# Patient Record
Sex: FEMALE | Race: WHITE | NOT HISPANIC OR LATINO | Employment: OTHER | URBAN - METROPOLITAN AREA
[De-identification: names, ages, dates, MRNs, and addresses within clinical notes are randomized per-mention and may not be internally consistent; named-entity substitution may affect disease eponyms.]

---

## 2019-10-28 ENCOUNTER — OFFICE VISIT CONVERTED (OUTPATIENT)
Dept: GASTROENTEROLOGY | Facility: CLINIC | Age: 79
End: 2019-10-28
Attending: PHYSICIAN ASSISTANT

## 2019-10-28 ENCOUNTER — CONVERSION ENCOUNTER (OUTPATIENT)
Dept: GASTROENTEROLOGY | Facility: CLINIC | Age: 79
End: 2019-10-28

## 2019-11-13 ENCOUNTER — HOSPITAL ENCOUNTER (OUTPATIENT)
Dept: GASTROENTEROLOGY | Facility: HOSPITAL | Age: 79
Setting detail: HOSPITAL OUTPATIENT SURGERY
Discharge: HOME | End: 2019-11-13
Attending: INTERNAL MEDICINE

## 2020-02-11 ENCOUNTER — OFFICE VISIT CONVERTED (OUTPATIENT)
Dept: GASTROENTEROLOGY | Facility: CLINIC | Age: 80
End: 2020-02-11
Attending: PHYSICIAN ASSISTANT

## 2020-05-11 ENCOUNTER — TELEPHONE CONVERTED (OUTPATIENT)
Dept: GASTROENTEROLOGY | Facility: CLINIC | Age: 80
End: 2020-05-11
Attending: PHYSICIAN ASSISTANT

## 2021-02-22 ENCOUNTER — HOSPITAL ENCOUNTER (OUTPATIENT)
Dept: VACCINE CLINIC | Facility: HOSPITAL | Age: 81
Discharge: HOME | End: 2021-02-22
Attending: INTERNAL MEDICINE

## 2021-03-25 ENCOUNTER — HOSPITAL ENCOUNTER (OUTPATIENT)
Dept: VACCINE CLINIC | Facility: HOSPITAL | Age: 81
Discharge: HOME | End: 2021-03-25
Attending: INTERNAL MEDICINE

## 2021-05-13 NOTE — PROGRESS NOTES
Quick Note      Patient Name: Ibis Tillman   Patient ID: [id number]   Sex: Female   YOB: 1940    Primary Care Provider: Ailyn Morrison PA-C    Visit Date: May 11, 2020    Provider: Brice Starr PA-C   Location: [facility]   Location Address: [address] [place] [address]  [id number]   Location Phone: [phone]          History Of Present Illness  TELEHEALTH TELEPHONE VISIT  Chief Complaint: Medication follow up   Ibis Tillman is a 79 year old female who is presenting for evaluation via telehealth telephone visit. Verbal consent obtained before beginning visit.   Provider spent 11 minutes with the patient during telehealth visit.   The following staff were present during this visit: Dimas Aguero MA   Past Medical History/Overview of Patient Symptoms     79-year-old female with history of hypertension, hyperlipidemia, and hysterectomy follows for medication.  Review of the colonoscopy by Dr. Dunlap 11/13/2019 showed internal hemorrhoids, diverticulosis sigmoid colon, and negative random biopsies.  She is on half a scoop of cholestyramine powder once daily which fluctuates her bowel movements from 6 times daily to once every 6 days.  She was given a trial of bentyl 10mg and she takes Metamucil which have resolved her symptoms.           Assessment  · Altered bowel habits     787.99/R19.4      Plan  · Orders  o Physician Telephone Evaluation, 11-20 minutes (82131) - 787.99/R19.4 - 05/11/2020  · Medications  o Medications have been Reconciled  o Transition of Care or Provider Policy  · Instructions  o Plan Of Care: Overall, the patient is doing well. Her symptoms have improved with benyl 10mg and Metamucil. She will call for worsening symptoms and follow up as needed.  o Chronic conditions reviewed and taken into consideration for today's treatment plan.  o Patient instructed to seek medical attention urgently for new or worsening symptoms.  o Patient was educated/instructed  on their diagnosis, treatment and medications prior to discharge from the clinic today.            Electronically Signed by: Brice Starr PA-C -Author on May 11, 2020 08:37:27 AM  Electronically Co-signed by: Francisco Dunlap MD -Reviewer on May 12, 2020 03:44:35 PM

## 2021-05-15 VITALS
DIASTOLIC BLOOD PRESSURE: 86 MMHG | OXYGEN SATURATION: 98 % | WEIGHT: 131.25 LBS | HEART RATE: 106 BPM | SYSTOLIC BLOOD PRESSURE: 210 MMHG | HEIGHT: 61 IN | BODY MASS INDEX: 24.78 KG/M2 | RESPIRATION RATE: 12 BRPM

## 2021-05-15 VITALS
HEART RATE: 88 BPM | HEIGHT: 61 IN | BODY MASS INDEX: 24.73 KG/M2 | RESPIRATION RATE: 16 BRPM | WEIGHT: 131 LBS | DIASTOLIC BLOOD PRESSURE: 80 MMHG | OXYGEN SATURATION: 98 % | SYSTOLIC BLOOD PRESSURE: 142 MMHG

## 2021-05-19 ENCOUNTER — HOSPITAL ENCOUNTER (OUTPATIENT)
Dept: LAB | Facility: HOSPITAL | Age: 81
Discharge: HOME | End: 2021-05-19
Attending: PHYSICIAN ASSISTANT

## 2021-05-19 LAB
ALBUMIN SERPL-MCNC: 4.3 G/DL (ref 3.5–5)
ALBUMIN/GLOB SERPL: 1.3 {RATIO} (ref 1.4–2.6)
ALP SERPL-CCNC: 71 U/L (ref 43–160)
ALT SERPL-CCNC: 11 U/L (ref 10–40)
ANION GAP SERPL CALC-SCNC: 16 MMOL/L (ref 8–19)
AST SERPL-CCNC: 19 U/L (ref 15–50)
BASOPHILS # BLD AUTO: 0.03 10*3/UL (ref 0–0.2)
BASOPHILS NFR BLD AUTO: 0.3 % (ref 0–3)
BILIRUB SERPL-MCNC: 0.4 MG/DL (ref 0.2–1.3)
BUN SERPL-MCNC: 10 MG/DL (ref 5–25)
BUN/CREAT SERPL: 13 {RATIO} (ref 6–20)
C DIFF TOX B STL QL CT TISS CULT: NEGATIVE
CALCIUM SERPL-MCNC: 9.2 MG/DL (ref 8.7–10.4)
CHLORIDE SERPL-SCNC: 95 MMOL/L (ref 99–111)
CONV 027 TOXIN: NEGATIVE
CONV ABS IMM GRAN: 0.01 10*3/UL (ref 0–0.2)
CONV CO2: 26 MMOL/L (ref 22–32)
CONV IMMATURE GRAN: 0.1 % (ref 0–1.8)
CONV TOTAL PROTEIN: 7.6 G/DL (ref 6.3–8.2)
CREAT UR-MCNC: 0.79 MG/DL (ref 0.5–0.9)
DEPRECATED RDW RBC AUTO: 41.2 FL (ref 36.4–46.3)
EOSINOPHIL # BLD AUTO: 0.06 10*3/UL (ref 0–0.7)
EOSINOPHIL # BLD AUTO: 0.7 % (ref 0–7)
ERYTHROCYTE [DISTWIDTH] IN BLOOD BY AUTOMATED COUNT: 13.2 % (ref 11.7–14.4)
GFR SERPLBLD BASED ON 1.73 SQ M-ARVRAT: >60 ML/MIN/{1.73_M2}
GLOBULIN UR ELPH-MCNC: 3.3 G/DL (ref 2–3.5)
GLUCOSE SERPL-MCNC: 125 MG/DL (ref 65–99)
HCT VFR BLD AUTO: 40.1 % (ref 37–47)
HGB BLD-MCNC: 13.1 G/DL (ref 12–16)
LYMPHOCYTES # BLD AUTO: 7.12 10*3/UL (ref 1–5)
LYMPHOCYTES NFR BLD AUTO: 81.7 % (ref 20–45)
MCH RBC QN AUTO: 28.1 PG (ref 27–31)
MCHC RBC AUTO-ENTMCNC: 32.7 G/DL (ref 33–37)
MCV RBC AUTO: 86.1 FL (ref 81–99)
MONOCYTES # BLD AUTO: 0.64 10*3/UL (ref 0.2–1.2)
MONOCYTES NFR BLD AUTO: 7.3 % (ref 3–10)
NEUTROPHILS # BLD AUTO: 0.85 10*3/UL (ref 2–8)
NEUTROPHILS NFR BLD AUTO: 9.9 % (ref 30–85)
NRBC CBCN: 0 % (ref 0–0.7)
OSMOLALITY SERPL CALC.SUM OF ELEC: 279 MOSM/KG (ref 273–304)
PLATELET # BLD AUTO: 210 10*3/UL (ref 130–400)
PMV BLD AUTO: 8.5 FL (ref 9.4–12.3)
POTASSIUM SERPL-SCNC: 3.3 MMOL/L (ref 3.5–5.3)
RBC # BLD AUTO: 4.66 10*6/UL (ref 4.2–5.4)
SODIUM SERPL-SCNC: 134 MMOL/L (ref 135–147)
WBC # BLD AUTO: 8.71 10*3/UL (ref 4.8–10.8)

## 2021-05-21 LAB
C COLI+JEJ+UPSA DNA STL QL NAA+NON-PROBE: NEGATIVE
CONV SALMONELLA SPECIES: NEGATIVE
CONV SHIGA TOXIN: NEGATIVE
CONV SHIGELLA SPECIES: NEGATIVE

## 2021-06-01 ENCOUNTER — HOSPITAL ENCOUNTER (OUTPATIENT)
Dept: LAB | Facility: HOSPITAL | Age: 81
Discharge: HOME | End: 2021-06-01
Attending: PHYSICIAN ASSISTANT

## 2021-06-01 LAB
ANION GAP SERPL CALC-SCNC: 18 MMOL/L (ref 8–19)
BUN SERPL-MCNC: 8 MG/DL (ref 5–25)
BUN/CREAT SERPL: 11 {RATIO} (ref 6–20)
CALCIUM SERPL-MCNC: 9 MG/DL (ref 8.7–10.4)
CHLORIDE SERPL-SCNC: 91 MMOL/L (ref 99–111)
CONV CO2: 24 MMOL/L (ref 22–32)
CREAT UR-MCNC: 0.74 MG/DL (ref 0.5–0.9)
GFR SERPLBLD BASED ON 1.73 SQ M-ARVRAT: >60 ML/MIN/{1.73_M2}
GLUCOSE SERPL-MCNC: 128 MG/DL (ref 65–99)
OSMOLALITY SERPL CALC.SUM OF ELEC: 268 MOSM/KG (ref 273–304)
POTASSIUM SERPL-SCNC: 3.7 MMOL/L (ref 3.5–5.3)
SODIUM SERPL-SCNC: 129 MMOL/L (ref 135–147)

## 2021-06-15 ENCOUNTER — TRANSCRIBE ORDERS (OUTPATIENT)
Dept: LAB | Facility: HOSPITAL | Age: 81
End: 2021-06-15

## 2021-06-15 ENCOUNTER — LAB (OUTPATIENT)
Dept: LAB | Facility: HOSPITAL | Age: 81
End: 2021-06-15

## 2021-06-15 DIAGNOSIS — R73.01 IMPAIRED FASTING GLUCOSE: ICD-10-CM

## 2021-06-15 DIAGNOSIS — R73.01 IMPAIRED FASTING GLUCOSE: Primary | ICD-10-CM

## 2021-06-15 LAB
ANION GAP SERPL CALCULATED.3IONS-SCNC: 11.4 MMOL/L (ref 5–15)
BUN SERPL-MCNC: 9 MG/DL (ref 8–23)
BUN/CREAT SERPL: 11.5 (ref 7–25)
CALCIUM SPEC-SCNC: 8.8 MG/DL (ref 8.6–10.5)
CHLORIDE SERPL-SCNC: 92 MMOL/L (ref 98–107)
CO2 SERPL-SCNC: 25.6 MMOL/L (ref 22–29)
CREAT SERPL-MCNC: 0.78 MG/DL (ref 0.57–1)
GFR SERPL CREATININE-BSD FRML MDRD: 71 ML/MIN/1.73
GLUCOSE SERPL-MCNC: 143 MG/DL (ref 65–99)
POTASSIUM SERPL-SCNC: 3.6 MMOL/L (ref 3.5–5.2)
SODIUM SERPL-SCNC: 129 MMOL/L (ref 136–145)

## 2021-06-15 PROCEDURE — 80048 BASIC METABOLIC PNL TOTAL CA: CPT

## 2021-06-15 PROCEDURE — 36415 COLL VENOUS BLD VENIPUNCTURE: CPT

## 2021-09-09 ENCOUNTER — OFFICE VISIT (OUTPATIENT)
Dept: GASTROENTEROLOGY | Facility: CLINIC | Age: 81
End: 2021-09-09

## 2021-09-09 VITALS
BODY MASS INDEX: 25.39 KG/M2 | DIASTOLIC BLOOD PRESSURE: 88 MMHG | HEIGHT: 60 IN | WEIGHT: 129.3 LBS | OXYGEN SATURATION: 99 % | HEART RATE: 101 BPM | SYSTOLIC BLOOD PRESSURE: 174 MMHG

## 2021-09-09 DIAGNOSIS — R19.4 ALTERED BOWEL HABITS: Primary | ICD-10-CM

## 2021-09-09 DIAGNOSIS — K58.0 IRRITABLE BOWEL SYNDROME WITH DIARRHEA: ICD-10-CM

## 2021-09-09 DIAGNOSIS — R19.7 DIARRHEA, UNSPECIFIED TYPE: ICD-10-CM

## 2021-09-09 PROCEDURE — 99213 OFFICE O/P EST LOW 20 MIN: CPT | Performed by: NURSE PRACTITIONER

## 2021-09-09 RX ORDER — DICYCLOMINE HYDROCHLORIDE 10 MG/1
10 CAPSULE ORAL
COMMUNITY
End: 2021-09-09 | Stop reason: SDUPTHER

## 2021-09-09 RX ORDER — LISINOPRIL AND HYDROCHLOROTHIAZIDE 25; 20 MG/1; MG/1
1 TABLET ORAL DAILY
COMMUNITY
Start: 2021-06-26

## 2021-09-09 RX ORDER — DICYCLOMINE HYDROCHLORIDE 10 MG/1
10 CAPSULE ORAL
Qty: 120 CAPSULE | Refills: 2 | Status: SHIPPED | OUTPATIENT
Start: 2021-09-09

## 2021-09-09 RX ORDER — CHOLESTYRAMINE 4 G/9G
1 POWDER, FOR SUSPENSION ORAL 3 TIMES DAILY PRN
COMMUNITY
Start: 2021-05-20 | End: 2021-09-09

## 2021-09-09 RX ORDER — PRAVASTATIN SODIUM 40 MG
1 TABLET ORAL DAILY
COMMUNITY

## 2021-09-09 RX ORDER — MONTELUKAST SODIUM 4 MG/1
2 TABLET, CHEWABLE ORAL 2 TIMES DAILY
Qty: 120 TABLET | Refills: 3 | Status: SHIPPED | OUTPATIENT
Start: 2021-09-09 | End: 2022-02-01

## 2021-09-09 RX ORDER — AMLODIPINE BESYLATE 5 MG/1
5 TABLET ORAL DAILY
COMMUNITY
Start: 2021-08-08

## 2021-09-09 NOTE — PATIENT INSTRUCTIONS
"Diet for Irritable Bowel Syndrome  When you have irritable bowel syndrome (IBS), it is very important to eat the foods and follow the eating habits that are best for your condition. IBS may cause various symptoms such as pain in the abdomen, constipation, or diarrhea. Choosing the right foods can help to ease the discomfort from these symptoms. Work with your health care provider and diet and nutrition specialist (dietitian) to find the eating plan that will help to control your symptoms.  What are tips for following this plan?         · Keep a food diary. This will help you identify foods that cause symptoms. Write down:  ? What you eat and when you eat it.  ? What symptoms you have.  ? When symptoms occur in relation to your meals, such as \"pain in abdomen 2 hours after dinner.\"  · Eat your meals slowly and in a relaxed setting.  · Aim to eat 5-6 small meals per day. Do not skip meals.  · Drink enough fluid to keep your urine pale yellow.  · Ask your health care provider if you should take an over-the-counter probiotic to help restore healthy bacteria in your gut (digestive tract).  ? Probiotics are foods that contain good bacteria and yeasts.  · Your dietitian may have specific dietary recommendations for you based on your symptoms. He or she may recommend that you:  ? Avoid foods that cause symptoms. Talk with your dietitian about other ways to get the same nutrients that are in those problem foods.  ? Avoid foods with gluten. Gluten is a protein that is found in rye, wheat, and barley.  ? Eat more foods that contain soluble fiber. Examples of foods with high soluble fiber include oats, seeds, and certain fruits and vegetables. Take a fiber supplement if directed by your dietitian.  ? Reduce or avoid certain foods called FODMAPs. These are foods that contain carbohydrates that are hard to digest. Ask your doctor which foods contain these carbohydrates.  What foods are not recommended?  The following are some " foods and drinks that may make your symptoms worse:  · Fatty foods, such as french fries.  · Foods that contain gluten, such as pasta and cereal.  · Dairy products, such as milk, cheese, and ice cream.  · Chocolate.  · Alcohol.  · Products with caffeine, such as coffee.  · Carbonated drinks, such as soda.  · Foods that are high in FODMAPs. These include certain fruits and vegetables.  · Products with sweeteners such as honey, high fructose corn syrup, sorbitol, and mannitol.  The items listed above may not be a complete list of foods and beverages you should avoid. Contact a dietitian for more information.  What foods are good sources of fiber?  Your health care provider or dietitian may recommend that you eat more foods that contain fiber. Fiber can help to reduce constipation and other IBS symptoms. Add foods with fiber to your diet a little at a time so your body can get used to them. Too much fiber at one time might cause gas and swelling of your abdomen. The following are some foods that are good sources of fiber:  · Berries, such as raspberries, strawberries, and blueberries.  · Tomatoes.  · Carrots.  · Brown rice.  · Oats.  · Seeds, such as heaven and pumpkin seeds.  The items listed above may not be a complete list of recommended sources of fiber. Contact your dietitian for more options.  Where to find more information  · International Foundation for Functional Gastrointestinal Disorders: www.iffgd.org  · National [place] of Diabetes and Digestive and Kidney Diseases: www.niddk.nih.gov  Summary  · When you have irritable bowel syndrome (IBS), it is very important to eat the foods and follow the eating habits that are best for your condition.  · IBS may cause various symptoms such as pain in the abdomen, constipation, or diarrhea.  · Choosing the right foods can help to ease the discomfort that comes from symptoms.  · Keep a food diary. This will help you identify foods that cause symptoms.  · Your health  care provider or diet and nutrition specialist (dietitian) may recommend that you eat more foods that contain fiber.  This information is not intended to replace advice given to you by your health care provider. Make sure you discuss any questions you have with your health care provider.  Document Revised: 04/08/2020 Document Reviewed: 08/21/2018  [employer] Patient Education © 2021 [employer]

## 2021-09-09 NOTE — PROGRESS NOTES
Chief Complaint  Hayley Tillman is a 81 y.o. female who presents to [facility]     HPI  81-year-old female presented office today for follow-up with a history of diarrhea.  Patient is currently taking Questran 4 g packet 2 times daily, Bentyl 10 mg as needed and Metamucil once daily.  Patient reports that a couple of months ago she did have loose stools and was not on the colestipol.  She started back on the colestipol and had resolution of her symptoms.  Patient wishes to switch to tablets rather than packets.  She is taking the Bentyl twice daily usually.  She reports a firm stool daily.  Patient denies fever, nausea, vomiting, weight loss, night sweats, melena, hematochezia, hematemesis.    Colonoscopy: Review of the patient's most recent colonoscopy performed by Dr. Dunlap on 11/13/2019 revealed a few nonbleeding diverticula, grade 1 hemorrhoids and mild diverticulosis otherwise normal.      Result Review :   The following data was reviewed by: Adelina Cruz NP on 09/09/2021 for             Past Medical History:   Diagnosis Date   • Essential hypertension    • High cholesterol        Past Surgical History:   Procedure Laterality Date   • COLONOSCOPY  2019   • HYSTERECTOMY           Current Outpatient Medications:   •  amLODIPine (NORVASC) 5 MG tablet, Take 5 mg by mouth Daily. for blood pressure., Disp: , Rfl:   •  dicyclomine (BENTYL) 10 MG capsule, Take 1 capsule by mouth 4 (Four) Times a Day Before Meals & at Bedtime., Disp: 120 capsule, Rfl: 2  •  lisinopril-hydrochlorothiazide (PRINZIDE,ZESTORETIC) 20-25 MG per tablet, Take 1 tablet by mouth Daily. for blood pressure, Disp: , Rfl:   •  pravastatin (PRAVACHOL) 40 MG tablet, Take 1 tablet by mouth Daily., Disp: , Rfl:   •  psyllium (METAMUCIL) 58.6 % packet, Take 1 packet by mouth Daily., Disp: , Rfl:   •  colestipol (Colestid) 1 g tablet, Take 2 tablets by mouth 2 (Two) Times a Day., Disp: 120 tablet, Rfl: 3  "    Allergies   Allergen Reactions   • Penicillins Other (See Comments)     Causes sores on body   • Sulfa Antibiotics Swelling       History reviewed. No pertinent family history.     Social History     Social History Narrative   • Not on file       Objective     Vital Signs:   /88 (BP Location: Right arm, Patient Position: Sitting, Cuff Size: Adult)   Pulse 101   Ht 152.4 cm (60\")   Wt 58.7 kg (129 lb 4.8 oz)   SpO2 99%   BMI 25.25 kg/m²     Body mass index is 25.25 kg/m².    Physical Exam  Constitutional:       General: She is not in acute distress.     Appearance: Normal appearance.   HENT:      Head: Normocephalic.   Eyes:      Conjunctiva/sclera: Conjunctivae normal.      Pupils: Pupils are equal, round, and reactive to light.      Visual Fields: Right eye visual fields normal and left eye visual fields normal.   Neck:      Trachea: Trachea normal.   Cardiovascular:      Rate and Rhythm: Normal rate and regular rhythm.      Heart sounds: Normal heart sounds.   Pulmonary:      Effort: Pulmonary effort is normal.      Breath sounds: Normal breath sounds and air entry.      Comments: Inspection of chest: normal appearance  Abdominal:      General: Abdomen is flat. Bowel sounds are normal.      Palpations: Abdomen is soft. There is no mass.      Tenderness: There is no guarding.   Musculoskeletal:      Right lower leg: No edema.      Left lower leg: No edema.   Skin:     Findings: No lesion.      Comments: Turgor is normal   Neurological:      Mental Status: She is alert and oriented to person, place, and time.   Psychiatric:         Mood and Affect: Mood and affect normal.                 Assessment and Plan    Diagnoses and all orders for this visit:    1. Altered bowel habits (Primary)    2. Diarrhea, unspecified type    3. Irritable bowel syndrome with diarrhea    Other orders  -     colestipol (Colestid) 1 g tablet; Take 2 tablets by mouth 2 (Two) Times a Day.  Dispense: 120 tablet; Refill: 3  -    "  dicyclomine (BENTYL) 10 MG capsule; Take 1 capsule by mouth 4 (Four) Times a Day Before Meals & at Bedtime.  Dispense: 120 capsule; Refill: 2    81-year-old female presented office today for follow-up with a history of diarrhea.  Patient has good control of symptoms with colestipol, Bentyl and Metamucil.  I have changed her Questran to colestipol 2 g twice daily.  Patient will continue Bentyl as needed and Metamucil.  We will follow up in the office in 6 months.  Patient is agreeable to this plan and will call with any questions or concerns.          Follow Up   Return in about 6 months (around 3/9/2022).  Patient was given instructions and counseling regarding her condition or for health maintenance advice. Please see specific information pulled into the AVS if appropriate.

## 2021-09-13 ENCOUNTER — TELEPHONE (OUTPATIENT)
Dept: GASTROENTEROLOGY | Facility: CLINIC | Age: 81
End: 2021-09-13

## 2021-09-13 NOTE — TELEPHONE ENCOUNTER
"Patient called the office stating she cannot take the colestipol pills. They're hard to swallow and \"get stuck in her throat.\" Patient advised to use the powder in the mean time and I will talk with Adelina to see if there is anything we can switch to. Patient aware Adelina is out of office. Patient states her bowel movements are the same, and no diarrhea. Please advise.   "

## 2021-09-20 NOTE — TELEPHONE ENCOUNTER
If the patient is unable to swallow the pills the powder would be the only other option for this medication class. Being that it is working I would remain on the medication.

## 2021-12-09 ENCOUNTER — TELEPHONE (OUTPATIENT)
Dept: GASTROENTEROLOGY | Facility: CLINIC | Age: 81
End: 2021-12-09

## 2021-12-09 NOTE — TELEPHONE ENCOUNTER
"Patient called the office stating her diarrhea has worsened. Patient states \"the colestipol packets aren't working.\" I asked patient how often she is taking the packets. She states she is only using a half a packet per day. I advised patient to increase to 1 packet BID. Patient agreed to try this, and is aware to contact the office if the diarrhea worsens. Patient denies any abdominal pain or fever. Patient states she has had stool studies in the past with other providers, and the results were negative. I also rescheduled patient's appointment to come into the office sooner. Patient is aware of new appt. Patient to call if she needs anything.   "

## 2022-02-01 ENCOUNTER — OFFICE VISIT (OUTPATIENT)
Dept: GASTROENTEROLOGY | Facility: CLINIC | Age: 82
End: 2022-02-01

## 2022-02-01 VITALS
DIASTOLIC BLOOD PRESSURE: 84 MMHG | OXYGEN SATURATION: 100 % | HEART RATE: 95 BPM | BODY MASS INDEX: 25.29 KG/M2 | HEIGHT: 60 IN | WEIGHT: 128.8 LBS | SYSTOLIC BLOOD PRESSURE: 156 MMHG

## 2022-02-01 DIAGNOSIS — K58.0 IRRITABLE BOWEL SYNDROME WITH DIARRHEA: Primary | ICD-10-CM

## 2022-02-01 DIAGNOSIS — R14.0 ABDOMINAL BLOATING: ICD-10-CM

## 2022-02-01 PROCEDURE — 99213 OFFICE O/P EST LOW 20 MIN: CPT | Performed by: NURSE PRACTITIONER

## 2022-02-01 RX ORDER — CHOLESTYRAMINE 4 G/9G
2 POWDER, FOR SUSPENSION ORAL DAILY
COMMUNITY
Start: 2021-12-02 | End: 2022-02-28

## 2022-02-01 RX ORDER — SIMETHICONE 80 MG
80 TABLET,CHEWABLE ORAL EVERY 6 HOURS PRN
COMMUNITY

## 2022-02-28 RX ORDER — CHOLESTYRAMINE 4 G/9G
POWDER, FOR SUSPENSION ORAL
Qty: 270 PACKET | Refills: 1 | Status: SHIPPED | OUTPATIENT
Start: 2022-02-28

## 2022-11-01 ENCOUNTER — OFFICE VISIT (OUTPATIENT)
Dept: GASTROENTEROLOGY | Facility: CLINIC | Age: 82
End: 2022-11-01

## 2022-11-01 VITALS
SYSTOLIC BLOOD PRESSURE: 132 MMHG | DIASTOLIC BLOOD PRESSURE: 62 MMHG | WEIGHT: 129.1 LBS | OXYGEN SATURATION: 100 % | HEIGHT: 60 IN | BODY MASS INDEX: 25.35 KG/M2 | HEART RATE: 97 BPM

## 2022-11-01 DIAGNOSIS — K58.0 IRRITABLE BOWEL SYNDROME WITH DIARRHEA: Primary | ICD-10-CM

## 2022-11-01 DIAGNOSIS — R15.9 INCONTINENCE OF FECES, UNSPECIFIED FECAL INCONTINENCE TYPE: ICD-10-CM

## 2022-11-01 PROCEDURE — 99213 OFFICE O/P EST LOW 20 MIN: CPT

## 2022-11-01 RX ORDER — CHOLESTYRAMINE 4 G/5.5G
POWDER, FOR SUSPENSION ORAL
COMMUNITY
Start: 2022-10-12

## 2023-04-29 ENCOUNTER — HOSPITAL ENCOUNTER (EMERGENCY)
Facility: HOSPITAL | Age: 83
Discharge: HOME | End: 2023-04-29
Attending: EMERGENCY MEDICINE
Payer: MEDICARE

## 2023-04-29 ENCOUNTER — APPOINTMENT (OUTPATIENT)
Dept: GENERAL RADIOLOGY | Facility: HOSPITAL | Age: 83
End: 2023-04-29
Payer: MEDICARE

## 2023-04-29 VITALS
HEART RATE: 93 BPM | HEIGHT: 60 IN | SYSTOLIC BLOOD PRESSURE: 138 MMHG | TEMPERATURE: 98.1 F | RESPIRATION RATE: 17 BRPM | BODY MASS INDEX: 24.54 KG/M2 | DIASTOLIC BLOOD PRESSURE: 77 MMHG | WEIGHT: 125 LBS | OXYGEN SATURATION: 99 %

## 2023-04-29 DIAGNOSIS — U07.1 COVID-19: Primary | ICD-10-CM

## 2023-04-29 LAB
ALBUMIN SERPL-MCNC: 4.6 G/DL (ref 3.5–5.2)
ALBUMIN/GLOB SERPL: 1.5 G/DL
ALP SERPL-CCNC: 80 U/L (ref 39–117)
ALT SERPL W P-5'-P-CCNC: 9 U/L (ref 1–33)
ANION GAP SERPL CALCULATED.3IONS-SCNC: 14.8 MMOL/L (ref 5–15)
AST SERPL-CCNC: 18 U/L (ref 1–32)
BASOPHILS # BLD AUTO: 0.01 10*3/MM3 (ref 0–0.2)
BASOPHILS NFR BLD AUTO: 0.2 % (ref 0–1.5)
BILIRUB SERPL-MCNC: 0.4 MG/DL (ref 0–1.2)
BUN SERPL-MCNC: 11 MG/DL (ref 8–23)
BUN/CREAT SERPL: 14.9 (ref 7–25)
CALCIUM SPEC-SCNC: 9.6 MG/DL (ref 8.6–10.5)
CHLORIDE SERPL-SCNC: 93 MMOL/L (ref 98–107)
CLUMPED PLATELETS: PRESENT
CO2 SERPL-SCNC: 27.2 MMOL/L (ref 22–29)
CREAT SERPL-MCNC: 0.74 MG/DL (ref 0.57–1)
DEPRECATED RDW RBC AUTO: 41.3 FL (ref 37–54)
EGFRCR SERPLBLD CKD-EPI 2021: 80.9 ML/MIN/1.73
EOSINOPHIL # BLD AUTO: 0.05 10*3/MM3 (ref 0–0.4)
EOSINOPHIL NFR BLD AUTO: 0.9 % (ref 0.3–6.2)
ERYTHROCYTE [DISTWIDTH] IN BLOOD BY AUTOMATED COUNT: 13.2 % (ref 12.3–15.4)
GLOBULIN UR ELPH-MCNC: 3.1 GM/DL
GLUCOSE SERPL-MCNC: 126 MG/DL (ref 65–99)
HCT VFR BLD AUTO: 38 % (ref 34–46.6)
HGB BLD-MCNC: 13.2 G/DL (ref 12–15.9)
HOLD SPECIMEN: NORMAL
HOLD SPECIMEN: NORMAL
IMM GRANULOCYTES # BLD AUTO: 0.01 10*3/MM3 (ref 0–0.05)
IMM GRANULOCYTES NFR BLD AUTO: 0.2 % (ref 0–0.5)
LYMPHOCYTES # BLD AUTO: 3.79 10*3/MM3 (ref 0.7–3.1)
LYMPHOCYTES NFR BLD AUTO: 67 % (ref 19.6–45.3)
MCH RBC QN AUTO: 29.5 PG (ref 26.6–33)
MCHC RBC AUTO-ENTMCNC: 34.7 G/DL (ref 31.5–35.7)
MCV RBC AUTO: 85 FL (ref 79–97)
MONOCYTES # BLD AUTO: 0.46 10*3/MM3 (ref 0.1–0.9)
MONOCYTES NFR BLD AUTO: 8.1 % (ref 5–12)
NEUTROPHILS NFR BLD AUTO: 1.34 10*3/MM3 (ref 1.7–7)
NEUTROPHILS NFR BLD AUTO: 23.6 % (ref 42.7–76)
NRBC BLD AUTO-RTO: 0 /100 WBC (ref 0–0.2)
NT-PROBNP SERPL-MCNC: 490.6 PG/ML (ref 0–1800)
PLATELET # BLD AUTO: 209 10*3/MM3 (ref 140–450)
PMV BLD AUTO: 8 FL (ref 6–12)
POTASSIUM SERPL-SCNC: 3.4 MMOL/L (ref 3.5–5.2)
PROT SERPL-MCNC: 7.7 G/DL (ref 6–8.5)
QT INTERVAL: 360 MS
RBC # BLD AUTO: 4.47 10*6/MM3 (ref 3.77–5.28)
RBC MORPH BLD: NORMAL
SARS-COV-2 RNA RESP QL NAA+PROBE: DETECTED
SMALL PLATELETS BLD QL SMEAR: ADEQUATE
SODIUM SERPL-SCNC: 135 MMOL/L (ref 136–145)
TROPONIN T SERPL HS-MCNC: 11 NG/L
WBC MORPH BLD: NORMAL
WBC NRBC COR # BLD: 5.66 10*3/MM3 (ref 3.4–10.8)
WHOLE BLOOD HOLD COAG: NORMAL
WHOLE BLOOD HOLD SPECIMEN: NORMAL

## 2023-04-29 PROCEDURE — U0004 COV-19 TEST NON-CDC HGH THRU: HCPCS

## 2023-04-29 PROCEDURE — 99284 EMERGENCY DEPT VISIT MOD MDM: CPT

## 2023-04-29 PROCEDURE — C9803 HOPD COVID-19 SPEC COLLECT: HCPCS | Performed by: EMERGENCY MEDICINE

## 2023-04-29 PROCEDURE — U0005 INFEC AGEN DETEC AMPLI PROBE: HCPCS

## 2023-04-29 PROCEDURE — 93005 ELECTROCARDIOGRAM TRACING: CPT | Performed by: EMERGENCY MEDICINE

## 2023-04-29 PROCEDURE — 93005 ELECTROCARDIOGRAM TRACING: CPT

## 2023-04-29 PROCEDURE — 80053 COMPREHEN METABOLIC PANEL: CPT | Performed by: EMERGENCY MEDICINE

## 2023-04-29 PROCEDURE — 71045 X-RAY EXAM CHEST 1 VIEW: CPT

## 2023-04-29 PROCEDURE — 83880 ASSAY OF NATRIURETIC PEPTIDE: CPT | Performed by: EMERGENCY MEDICINE

## 2023-04-29 PROCEDURE — 85025 COMPLETE CBC W/AUTO DIFF WBC: CPT | Performed by: EMERGENCY MEDICINE

## 2023-04-29 PROCEDURE — 84484 ASSAY OF TROPONIN QUANT: CPT | Performed by: EMERGENCY MEDICINE

## 2023-04-29 PROCEDURE — 85007 BL SMEAR W/DIFF WBC COUNT: CPT | Performed by: EMERGENCY MEDICINE

## 2023-04-29 RX ORDER — SODIUM CHLORIDE 0.9 % (FLUSH) 0.9 %
10 SYRINGE (ML) INJECTION AS NEEDED
Status: DISCONTINUED | OUTPATIENT
Start: 2023-04-29 | End: 2023-04-29 | Stop reason: HOSPADM

## 2023-04-29 NOTE — ED PROVIDER NOTES
Time: 9:57 AM EDT  Date of encounter:  4/29/2023  Independent Historian/Clinical History and Information was obtained by:   Patient  Chief Complaint: Shortness of breath    History is limited by: N/A    History of Present Illness:  Patient is a 82 y.o. year old female who presents to the emergency department for evaluation of shortness of breath.  Patient states her and her  tested positive for covid Friday April 21st. Patient states her and her  both have had increased shortness of breath over the past week. Pt reports fever. Pt reports SOB started yesterday and has been intermittent. Pt has been vaccinated for COVID and has gotten all the boosters. Pt does not have a history of coronary disease. Pt has a history of hypertension.      Shortness of Breath  Severity:  Moderate  Onset quality:  Sudden  Duration:  1 week  Timing:  Constant  Progression:  Worsening  Chronicity:  New  Context comment:  Sick Contacts, Known infection  Associated symptoms: fever    Associated symptoms: no abdominal pain, no chest pain, no cough, no headaches, no sore throat and no vomiting        Patient Care Team  Primary Care Provider: Ailyn Morrison, LARRY    Past Medical History:     Allergies   Allergen Reactions   • Penicillins Other (See Comments)     Causes sores on body   • Sulfa Antibiotics Swelling     Past Medical History:   Diagnosis Date   • Essential hypertension    • High cholesterol      Past Surgical History:   Procedure Laterality Date   • COLONOSCOPY  2019   • HYSTERECTOMY       Family History   Problem Relation Age of Onset   • Colon cancer Neg Hx        Home Medications:  Prior to Admission medications    Medication Sig Start Date End Date Taking? Authorizing Provider   amLODIPine (NORVASC) 5 MG tablet Take 5 mg by mouth Daily. for blood pressure. 8/8/21   Provider, MD Zeina   cholestyramine (QUESTRAN) 4 g packet TAKE 1 PACKET (4 GRAM) DISSOLVED IN 2 TO 6 OUNCES OF WATER OR NONCARBONATED  BEVERAGE BY MOUTH 3 TIMES A DAY 2/28/22   Adelina Cruz APRN   dicyclomine (BENTYL) 10 MG capsule Take 1 capsule by mouth 4 (Four) Times a Day Before Meals & at Bedtime. 9/9/21   Adelina Cruz APRN   lisinopril-hydrochlorothiazide (PRINZIDE,ZESTORETIC) 20-25 MG per tablet Take 1 tablet by mouth Daily. for blood pressure 6/26/21   Zeina Krishnan MD   pravastatin (PRAVACHOL) 40 MG tablet Take 1 tablet by mouth Daily.    eZina Krishnan MD   Prevalite 4 GM/DOSE powder PLEASE SEE ATTACHED FOR DETAILED DIRECTIONS 10/12/22   Zeina Krishnan MD   psyllium (METAMUCIL) 58.6 % packet Take 1 packet by mouth Daily.    Zeina Krishnan MD   simethicone (MYLICON) 80 MG chewable tablet Chew 80 mg Every 6 (Six) Hours As Needed for Flatulence.    Zeina Krishnan MD        Social History:   Social History     Tobacco Use   • Smoking status: Never   • Smokeless tobacco: Never   • Tobacco comments:     NO SECOND HAND SMOKE EXPOSURE   Vaping Use   • Vaping Use: Never used   Substance Use Topics   • Alcohol use: Yes     Comment: OCCASIONALLY DRINKS WINE   • Drug use: Never         Review of Systems:  Review of Systems   Constitutional: Positive for fever. Negative for chills.   HENT: Negative for congestion, rhinorrhea and sore throat.    Eyes: Negative for pain and visual disturbance.   Respiratory: Positive for shortness of breath. Negative for apnea, cough and chest tightness.    Cardiovascular: Negative for chest pain and palpitations.   Gastrointestinal: Negative for abdominal pain, diarrhea, nausea and vomiting.   Genitourinary: Negative for difficulty urinating and dysuria.   Musculoskeletal: Negative for joint swelling and myalgias.   Skin: Negative for color change.   Neurological: Negative for seizures and headaches.   Psychiatric/Behavioral: Negative.    All other systems reviewed and are negative.       Physical Exam:  /77   Pulse 93   Temp 98.1 °F (36.7 °C) (Oral)   Resp 17   Ht 152.4  "cm (60\")   Wt 56.7 kg (125 lb)   SpO2 99%   BMI 24.41 kg/m²     Physical Exam  Vitals and nursing note reviewed.   Constitutional:       General: She is not in acute distress.     Appearance: Normal appearance. She is not toxic-appearing.   HENT:      Head: Normocephalic and atraumatic.      Jaw: There is normal jaw occlusion.   Eyes:      General: Lids are normal.      Extraocular Movements: Extraocular movements intact.      Conjunctiva/sclera: Conjunctivae normal.      Pupils: Pupils are equal, round, and reactive to light.   Cardiovascular:      Rate and Rhythm: Normal rate and regular rhythm.      Pulses: Normal pulses.      Heart sounds: Normal heart sounds.   Pulmonary:      Effort: Pulmonary effort is normal. No respiratory distress.      Breath sounds: Normal breath sounds. No wheezing or rhonchi.   Abdominal:      General: Abdomen is flat.      Palpations: Abdomen is soft.      Tenderness: There is no abdominal tenderness. There is no guarding or rebound.   Musculoskeletal:         General: Normal range of motion.      Cervical back: Normal range of motion and neck supple.      Right lower leg: No edema.      Left lower leg: No edema.   Skin:     General: Skin is warm and dry.   Neurological:      Mental Status: She is alert and oriented to person, place, and time. Mental status is at baseline.   Psychiatric:         Mood and Affect: Mood normal.                  Procedures:  Procedures      Medical Decision Making:      Comorbidities that affect care:    High cholesterol, Hypertension    External Notes reviewed:    Previous Clinic Note: Patient has a history of IBS-D.      The following orders were placed and all results were independently analyzed by me:  Orders Placed This Encounter   Procedures   • COVID-19,APTIMA PANTHER(TARA), JANETTE/ [facility], NP/OP SWAB IN UTM/VTM/SALINE TRANSPORT MEDIA,24 HR TAT - Swab, Nasopharynx   • XR Chest 1 View   • Bee Draw   • Comprehensive Metabolic Panel   • BNP   • " Single High Sensitivity Troponin T   • CBC Auto Differential   • Scan Slide   • NPO Diet NPO Type: Strict NPO   • Undress & Gown   • Cardiac Monitoring   • Continuous Pulse Oximetry   • Vital Signs   • Oxygen Therapy- Nasal Cannula; 2 LPM; Titrate for SPO2: equal to or greater than, 92%   • ECG 12 Lead ED Triage Standing Order; SOA   • Insert Peripheral IV   • CBC & Differential   • Green Top (Gel)   • Lavender Top   • Gold Top - SST   • Light Blue Top       Medications Given in the Emergency Department:  Medications   sodium chloride 0.9 % flush 10 mL (has no administration in time range)        ED Course:    ED Course as of 04/29/23 1216   Sat Apr 29, 2023   1213 EKG:    Rhythm: sinus  Rate: 93  Axis: normal  Intervals: normal  ST Segment: no elevations      Interpreted by me   [BN]      ED Course User Index  [BN] Jorge Sosa MD       Labs:    Lab Results (last 24 hours)     Procedure Component Value Units Date/Time    COVID-19,APTIMA PANTHER(TARA),[facility], NP/OP SWAB IN UTM/VTM/SALINE TRANSPORT MEDIA,24 HR TAT - Swab, Nasopharynx [[id number]] Collected: 04/29/23 0904    Specimen: Swab from Nasopharynx Updated: 04/29/23 0917    CBC & Differential [[id number]]  (Abnormal) Collected: 04/29/23 0912    Specimen: Blood Updated: 04/29/23 0946    Narrative:      The following orders were created for panel order CBC & Differential.  Procedure                               Abnormality         Status                     ---------                               -----------         ------                     CBC Auto Differential[[id number]]        Abnormal            Final result               Scan Slide[[id number]]                                       Final result                 Please view results for these tests on the individual orders.    Comprehensive Metabolic Panel [[id number]]  (Abnormal) Collected: 04/29/23 0912    Specimen: Blood Updated: 04/29/23 0952     Glucose 126 mg/dL      BUN 11 mg/dL       Creatinine 0.74 mg/dL      Sodium 135 mmol/L      Potassium 3.4 mmol/L      Chloride 93 mmol/L      CO2 27.2 mmol/L      Calcium 9.6 mg/dL      Total Protein 7.7 g/dL      Albumin 4.6 g/dL      ALT (SGPT) 9 U/L      AST (SGOT) 18 U/L      Alkaline Phosphatase 80 U/L      Total Bilirubin 0.4 mg/dL      Globulin 3.1 gm/dL      A/G Ratio 1.5 g/dL      BUN/Creatinine Ratio 14.9     Anion Gap 14.8 mmol/L      eGFR 80.9 mL/min/1.73     Narrative:      GFR Normal >60  Chronic Kidney Disease <60  Kidney Failure <15    The GFR formula is only valid for adults with stable renal function between ages 18 and 70.    BNP [[id number]]  (Normal) Collected: 04/29/23 0912    Specimen: Blood Updated: 04/29/23 0947     proBNP 490.6 pg/mL     Narrative:      Among patients with dyspnea, NT-proBNP is highly sensitive for the detection of acute congestive heart failure. In addition NT-proBNP of <300 pg/ml effectively rules out acute congestive heart failure with 99% negative predictive value.      Single High Sensitivity Troponin T [[id number]]  (Abnormal) Collected: 04/29/23 0912    Specimen: Blood Updated: 04/29/23 0952     HS Troponin T 11 ng/L     Narrative:      High Sensitive Troponin T Reference Range:  <10.0 ng/L- Negative Female for AMI  <15.0 ng/L- Negative Male for AMI  >=10 - Abnormal Female indicating possible myocardial injury.  >=15 - Abnormal Male indicating possible myocardial injury.   Clinicians would have to utilize clinical acumen, EKG, Troponin, and serial changes to determine if it is an Acute Myocardial Infarction or myocardial injury due to an underlying chronic condition.         CBC Auto Differential [[id number]]  (Abnormal) Collected: 04/29/23 0912    Specimen: Blood Updated: 04/29/23 0946     WBC 5.66 10*3/mm3      RBC 4.47 10*6/mm3      Hemoglobin 13.2 g/dL      Hematocrit 38.0 %      MCV 85.0 fL      MCH 29.5 pg      MCHC 34.7 g/dL      RDW 13.2 %      RDW-SD 41.3 fl      MPV 8.0 fL      Platelets 209 10*3/mm3       Neutrophil % 23.6 %      Lymphocyte % 67.0 %      Monocyte % 8.1 %      Eosinophil % 0.9 %      Basophil % 0.2 %      Immature Grans % 0.2 %      Neutrophils, Absolute 1.34 10*3/mm3      Lymphocytes, Absolute 3.79 10*3/mm3      Monocytes, Absolute 0.46 10*3/mm3      Eosinophils, Absolute 0.05 10*3/mm3      Basophils, Absolute 0.01 10*3/mm3      Immature Grans, Absolute 0.01 10*3/mm3      nRBC 0.0 /100 WBC     Scan Slide [[id number]] Collected: 04/29/23 0912    Specimen: Blood Updated: 04/29/23 0946     RBC Morphology Normal     WBC Morphology Normal     Platelet Estimate Adequate     Clumped Platelets Present           Imaging:    XR Chest 1 View    Result Date: 4/29/2023  PROCEDURE: XR CHEST 1 VW  COMPARISON: None  INDICATIONS: shortness of breath  FINDINGS:  There is a large retrocardiac density containing gas that likely reflects a large hiatal hernia. There is no pneumothorax, pleural effusion or focal airspace consolidation. The heart size and pulmonary vasculature appear within normal limits. There are no acute osseous abnormalities.        1. No acute cardiopulmonary abnormality. 2. Suspected large hiatal hernia.  Correlate with history.       RAPHAEL TOPETE MD       Electronically Signed and Approved By: RAPHEAL TOPETE MD on 4/29/2023 at 9:33                 Differential Diagnosis and Discussion:    Dyspnea: Differential diagnosis includes but is not limited to metabolic acidosis, neurological disorders, psychogenic, asthma, pneumothorax, upper airway obstruction, COPD, pneumonia, noncardiogenic pulmonary edema, interstitial lung disease, anemia, congestive heart failure, and pulmonary embolism    All labs were reviewed and interpreted by me.  All X-rays impressions were independently interpreted by me.  EKG was interpreted by me.     The patient´s CBC that was reviewed and interpreted by me shows no abnormalities of critical concern. Of note, there is no anemia requiring a blood transfusion and the  platelet count is acceptable.  The patient´s CMP that was reviewed and interpretted by me shows no abnormalities of critical concern. Of note, the patient´s sodium and potassium are acceptable. The patient´s liver enzymes are unremarkable. The patient´s renal function (creatinine) is preserved. The patient has a normal anion gap.  BNP is 490.  Troponin is 11. Chest x-ray is negative for acute infiltrate as interpreted by me.    MDM  Number of Diagnoses or Management Options     Amount and/or Complexity of Data Reviewed  Clinical lab tests: ordered and reviewed  Tests in the radiology section of CPT®: ordered and reviewed  Independent visualization of images, tracings, or specimens: yes    Risk of Complications, Morbidity, and/or Mortality  Presenting problems: moderate  Management options: moderate    Patient Progress  Patient progress: stable       COVID-19, caused by the SARS-CoV-2 virus, primarily affects the respiratory system, leading to a range of respiratory issues that can vary in severity. Common respiratory symptoms include cough, shortness of breath, and difficulty breathing. In more severe cases, the virus can cause pneumonia, a condition where the air sacs in the lungs become inflamed and filled with fluid, impairing the exchange of oxygen and carbon dioxide. This can result in dangerously low oxygen levels in the bloodstream (hypoxia), leading to respiratory distress and potentially requiring supplemental oxygen or mechanical ventilation.  This is my major concern and evaluation of this patient.    In addition to the acute respiratory symptoms, some individuals may experience long-lasting respiratory complications following the initial infection, such as persistent shortness of breath, chest pain, or reduced exercise capacity. These lingering symptoms can significantly impact an individual's quality of life and ability to perform daily activities. Acutely, COVID-19 can lead to severe respiratory  distress, pneumonia, organ failure, or even death, particularly in high-risk individuals such as the elderly or those with underlying health conditions.  Mrs. Tillman, who is an elderly 82-year-old female with a history of hypertension, she was evaluated for severe respiratory distress, hypoxia, and was monitored in our emergency department for 3 hours and 20 minutes.  The patient presented with a productive cough. The patient is well-appearing and in no respiratory distress. The patient has a normal mental status and is neurologically intact. The patient appears well and is able to tolerate food for fluid by mouth and there's no significant dehydration. There is no respiratory distress and no signs of systemic toxicity. The history, exam, diagnostic testing and current condition do not demonstrate an infectious process such as meningitis, retropharyngeal abscess, epiglottitis, sepsis or other serious bacterial infection requiring further testing, treatment, consultation, or admission at this time. The patient has no additional oxygen requirement. The patient has a PORT score that is suitable for outpatient treatment. The vital signs have been stable. The patient's condition is stable and appropriate for discharge. The patient was advised to return for worsening fever, respiratory distress, intractable vomiting, weakness, shortness of breath, chest pain, or altered mental status. The patient will pursue further outpatient evaluation with the primary care physician. The patient has expressed a clear and thorough understanding and agreed to follow-up as instructed.          Patient Care Considerations:    PERC: I used the PERC score to risk stratify the patient for PE and a CT of the chest was considered but ultimately not indicated in today's visit.      Consultants/Shared Management Plan:    None    Social Determinants of Health:    Patient is independent, reliable, and has access to care.       Disposition and  Care Coordination:    Discharged: I considered escalation of care by admitting this patient for observation, however the patient has improved and is suitable and  stable for discharge.    I have explained the patient´s condition, diagnoses and treatment plan based on the information available to me at this time. I have answered questions and addressed any concerns. The patient has a good  understanding of the patient´s diagnosis, condition, and treatment plan as can be expected at this point. The vital signs have been stable. The patient´s condition is stable and appropriate for discharge from the emergency department.      The patient will pursue further outpatient evaluation with the primary care physician or other designated or consulting physician as outlined in the discharge instructions. They are agreeable to this plan of care and follow-up instructions have been explained in detail. The patient has received these instructions in written format and have expressed an understanding of the discharge instructions. The patient is aware that any significant change in condition or worsening of symptoms should prompt an immediate return to this or the closest emergency department or call to 911.  I have explained discharge medications and the need for follow up with the patient/caretakers. This was also printed in the discharge instructions. Patient was discharged with the following medications and follow up:      Medication List      No changes were made to your prescriptions during this visit.      Ailyn Morrison, LARRY  [address] [place] [id number]  [phone]    In 2 days         Final diagnoses:   COVID-19        ED Disposition     ED Disposition   Discharge    Condition   Stable    Comment   --             This medical record created using voice recognition software.        Documentation assistance provided by Diana Corea acting as scribe for Jorge Sosa MD. Information recorded by the scribe  was done at my direction and has been verified and validated by me.          Diana Corea  04/29/23 1110       Jorge Sosa MD  04/29/23 1210

## 2023-05-03 LAB — QT INTERVAL: 360 MS

## 2023-05-04 ENCOUNTER — OFFICE VISIT (OUTPATIENT)
Dept: FAMILY MEDICINE CLINIC | Facility: CLINIC | Age: 83
End: 2023-05-04
Payer: MEDICARE

## 2023-05-04 VITALS
BODY MASS INDEX: 25.29 KG/M2 | OXYGEN SATURATION: 99 % | SYSTOLIC BLOOD PRESSURE: 142 MMHG | HEART RATE: 95 BPM | HEIGHT: 60 IN | TEMPERATURE: 98.3 F | WEIGHT: 128.8 LBS | DIASTOLIC BLOOD PRESSURE: 77 MMHG

## 2023-05-04 DIAGNOSIS — K58.2 IRRITABLE BOWEL SYNDROME WITH BOTH CONSTIPATION AND DIARRHEA: ICD-10-CM

## 2023-05-04 DIAGNOSIS — I10 PRIMARY HYPERTENSION: ICD-10-CM

## 2023-05-04 DIAGNOSIS — E78.2 MIXED HYPERLIPIDEMIA: Primary | ICD-10-CM

## 2023-05-04 RX ORDER — ERGOCALCIFEROL 1.25 MG/1
1 CAPSULE ORAL WEEKLY
COMMUNITY
Start: 2023-04-13

## 2023-05-04 NOTE — ASSESSMENT & PLAN NOTE
Lipid abnormalities are improving with treatment.  stable on pravastatin   Lipids will be reassessed in 6 months.

## 2023-05-04 NOTE — PROGRESS NOTES
Chief Complaint  Establish Care    Subjective          Ibis Tillman is a 82 y.o. female who presents to [facility]    History of Present Illness    Here to establish as a patient:  Recently has COVID, went to the ER, feeling good now.  Sees Oncologist in [place] for CLL  Lives in [place] and also lives here part of the year, son lives here. PCP in [place] follows all labs and refills meds at this time.  IBS-dicyclomine Benefiber and cholestyramine, doing really well.   HTN-managed on amlodipine and lisinopril/HCTZ  Hyperlipidemia-Pravastatin manages it.    PHQ-2 Total Score: 0   PHQ-9 Total Score: 0        Review of Systems       Medical History: has a past medical history of Essential hypertension and High cholesterol.     Surgical History: has a past surgical history that includes Colonoscopy (2019) and Hysterectomy.     Family History: family history is not on file.     Social History: reports that she has never smoked. She has never used smokeless tobacco. She reports current alcohol use. She reports that she does not use drugs.    Allergies: Penicillins and Sulfa antibiotics      Health Maintenance Due   Topic Date Due   • DXA SCAN  Never done   • TDAP/TD VACCINES (1 - Tdap) Never done   • ZOSTER VACCINE (1 of 2) 07/05/1990   • Pneumococcal Vaccine 65+ (2 - PPSV23 if available, else PCV20) 01/01/2016   • ANNUAL WELLNESS VISIT  Never done   • LIPID PANEL  Never done            Current Outpatient Medications:   •  amLODIPine (NORVASC) 5 MG tablet, Take 1 tablet by mouth Daily. for blood pressure., Disp: , Rfl:   •  cholestyramine (QUESTRAN) 4 g packet, TAKE 1 PACKET (4 GRAM) DISSOLVED IN 2 TO 6 OUNCES OF WATER OR NONCARBONATED BEVERAGE BY MOUTH 3 TIMES A DAY, Disp: 270 packet, Rfl: 1  •  dicyclomine (BENTYL) 10 MG capsule, Take 1 capsule by mouth 4 (Four) Times a Day Before Meals & at Bedtime., Disp: 120 capsule, Rfl: 2  •  lisinopril-hydrochlorothiazide (PRINZIDE,ZESTORETIC) 20-25 MG per  "tablet, Take 1 tablet by mouth Daily. for blood pressure, Disp: , Rfl:   •  pravastatin (PRAVACHOL) 40 MG tablet, Take 1 tablet by mouth Daily., Disp: , Rfl:   •  psyllium (METAMUCIL) 58.6 % packet, Take 1 packet by mouth Daily., Disp: , Rfl:   •  simethicone (MYLICON) 80 MG chewable tablet, Chew 1 tablet Every 6 (Six) Hours As Needed for Flatulence., Disp: , Rfl:   •  vitamin D (ERGOCALCIFEROL) 1.25 MG (25749 UT) capsule capsule, Take 1 capsule by mouth 1 (One) Time Per Week., Disp: , Rfl:   •  Prevalite 4 GM/DOSE powder, PLEASE SEE ATTACHED FOR DETAILED DIRECTIONS (Patient not taking: Reported on 5/4/2023), Disp: , Rfl:       Immunization History   Administered Date(s) Administered   • COVID-19 (MODERNA) 1st,2nd,3rd Dose Monovalent 02/22/2021, 03/25/2021   • Hepatitis A 04/28/2018, 10/30/2018   • Influenza, Unspecified 10/05/2016   • Pneumococcal Conjugate 13-Valent (PCV13) 01/01/2015   • Zoster, Unspecified 01/01/2014         Objective       Vitals:    05/04/23 0945   BP: 142/77   BP Location: Right arm   Patient Position: Sitting   Cuff Size: Adult   Pulse: 95   Temp: 98.3 °F (36.8 °C)   TempSrc: Temporal   SpO2: 99%   Weight: 58.4 kg (128 lb 12.8 oz)   Height: 152.4 cm (60\")   PainSc: 0-No pain      Body mass index is 25.15 kg/m².   Wt Readings from Last 3 Encounters:   05/04/23 58.4 kg (128 lb 12.8 oz)   04/29/23 56.7 kg (125 lb)   11/01/22 58.6 kg (129 lb 1.6 oz)      BP Readings from Last 3 Encounters:   05/04/23 142/77   04/29/23 138/77   11/01/22 132/62        Physical Exam  Vitals reviewed.   Constitutional:       Appearance: Normal appearance.   HENT:      Head: Normocephalic and atraumatic.   Eyes:      Conjunctiva/sclera: Conjunctivae normal.      Pupils: Pupils are equal, round, and reactive to light.   Cardiovascular:      Rate and Rhythm: Normal rate and regular rhythm.      Pulses: Normal pulses.      Heart sounds: Normal heart sounds.   Pulmonary:      Effort: Pulmonary effort is normal.      " Breath sounds: Normal breath sounds.   Abdominal:      General: Bowel sounds are normal.      Palpations: Abdomen is soft.   Skin:     General: Skin is warm and dry.   Neurological:      Mental Status: She is alert and oriented to person, place, and time.   Psychiatric:         Mood and Affect: Mood normal.         Behavior: Behavior normal.         Thought Content: Thought content normal.         Judgment: Judgment normal.             Result Review :       Common labs        4/29/2023    09:12   Common Labs   Glucose 126     BUN 11     Creatinine 0.74     Sodium 135     Potassium 3.4     Chloride 93     Calcium 9.6     Albumin 4.6     Total Bilirubin 0.4     Alkaline Phosphatase 80     AST (SGOT) 18     ALT (SGPT) 9     WBC 5.66     Hemoglobin 13.2     Hematocrit 38.0     Platelets 209                        Assessment and Plan        Diagnoses and all orders for this visit:    1. Mixed hyperlipidemia (Primary)  Assessment & Plan:  Lipid abnormalities are improving with treatment.  stable on pravastatin   Lipids will be reassessed in 6 months.      2. Primary hypertension  Assessment & Plan:  Hypertension is unchanged.  Continue current treatment regimen.  Dietary sodium restriction.  Weight loss.  Regular aerobic exercise.  Blood pressure will be reassessed at the next regular appointment.    Stable on Amlodipine and lisinopril/HCTZ      3. Irritable bowel syndrome with both constipation and diarrhea  Assessment & Plan:  Managed on Benefiber, dicyclomine, cholestyramine          Follow Up     Return if symptoms worsen or fail to improve.    Patient was given instructions and counseling regarding her condition or for health maintenance advice. Please see specific information pulled into the AVS if appropriate.     SALTY Serrato

## 2023-05-04 NOTE — ASSESSMENT & PLAN NOTE
Hypertension is unchanged.  Continue current treatment regimen.  Dietary sodium restriction.  Weight loss.  Regular aerobic exercise.  Blood pressure will be reassessed at the next regular appointment.    Stable on Amlodipine and lisinopril/HCTZ

## 2023-05-08 ENCOUNTER — PATIENT ROUNDING (BHMG ONLY) (OUTPATIENT)
Dept: FAMILY MEDICINE CLINIC | Facility: CLINIC | Age: 83
End: 2023-05-08
Payer: MEDICARE

## 2023-05-08 NOTE — PROGRESS NOTES
A [employer] MESSAGE HAS BEEN SENT TO THE PATIENT FOR PATIENT ROUNDING WITH [facility]

## 2024-01-10 ENCOUNTER — OFFICE VISIT (OUTPATIENT)
Dept: GASTROENTEROLOGY | Facility: CLINIC | Age: 84
End: 2024-01-10
Payer: MEDICARE

## 2024-01-10 VITALS
SYSTOLIC BLOOD PRESSURE: 131 MMHG | HEIGHT: 60 IN | HEART RATE: 85 BPM | DIASTOLIC BLOOD PRESSURE: 80 MMHG | BODY MASS INDEX: 25.13 KG/M2 | WEIGHT: 128 LBS | OXYGEN SATURATION: 100 %

## 2024-01-10 DIAGNOSIS — K58.0 IRRITABLE BOWEL SYNDROME WITH DIARRHEA: Primary | ICD-10-CM

## 2024-01-10 RX ORDER — POTASSIUM CHLORIDE 750 MG/1
TABLET, FILM COATED, EXTENDED RELEASE ORAL EVERY 24 HOURS
COMMUNITY

## 2024-01-10 NOTE — PROGRESS NOTES
Chief Complaint  Incontinence of feces and Diarrhea    Ibis Tillman is a 83 y.o. female who presents to [facility] for 1 year follow-up.    History of present Illness  Patient presents to the office for 1 year follow-up with a history of IBS-diarrhea.Patient is managing IBS-D with cholestyramine about 1/2 packet daily and dicyclomine BID.  Patient reports with this regimen she has 2-3 soft formed bowel movements daily.  Dicyclomine is adequately managing fecal urgency and abdominal discomfort.  She avoids certain food triggers.  Denies abdominal pain, melena, and hematochezia.  Denies upper GI symptoms.    Colonoscopy 11/13/2019 by Dr. Dunlap -mild diverticulosis in the sigmoid colon, grade 1 internal hemorrhoids, otherwise normal mucosa throughout.  Random colon biopsies normal.     Past Medical History:   Diagnosis Date    Essential hypertension     High cholesterol        Past Surgical History:   Procedure Laterality Date    COLONOSCOPY  2019    HYSTERECTOMY           Current Outpatient Medications:     amLODIPine (NORVASC) 5 MG tablet, Take 1 tablet by mouth Daily. for blood pressure., Disp: , Rfl:     cholestyramine (QUESTRAN) 4 g packet, TAKE 1 PACKET (4 GRAM) DISSOLVED IN 2 TO 6 OUNCES OF WATER OR NONCARBONATED BEVERAGE BY MOUTH 3 TIMES A DAY, Disp: 270 packet, Rfl: 1    dicyclomine (BENTYL) 10 MG capsule, Take 1 capsule by mouth 4 (Four) Times a Day Before Meals & at Bedtime., Disp: 120 capsule, Rfl: 2    lisinopril-hydrochlorothiazide (PRINZIDE,ZESTORETIC) 20-25 MG per tablet, Take 1 tablet by mouth Daily. for blood pressure, Disp: , Rfl:     pravastatin (PRAVACHOL) 40 MG tablet, Take 1 tablet by mouth Daily., Disp: , Rfl:     potassium chloride (KLOR-CON) 10 MEQ CR tablet, Daily. (Patient not taking: Reported on 1/10/2024), Disp: , Rfl:     Prevalite 4 GM/DOSE powder, PLEASE SEE ATTACHED FOR DETAILED DIRECTIONS (Patient not taking: Reported on 1/10/2024),  "Disp: , Rfl:     psyllium (METAMUCIL) 58.6 % packet, Take 1 packet by mouth Daily. (Patient not taking: Reported on 1/10/2024), Disp: , Rfl:     simethicone (MYLICON) 80 MG chewable tablet, Chew 1 tablet Every 6 (Six) Hours As Needed for Flatulence. (Patient not taking: Reported on 1/10/2024), Disp: , Rfl:     vitamin D (ERGOCALCIFEROL) 1.25 MG (37020 UT) capsule capsule, Take 1 capsule by mouth 1 (One) Time Per Week. (Patient not taking: Reported on 1/10/2024), Disp: , Rfl:      Allergies   Allergen Reactions    Erythromycin Unknown - High Severity    Neomycin Unknown - High Severity    Penicillins Other (See Comments)     Causes sores on body    Sulfa Antibiotics Swelling       Family History   Problem Relation Age of Onset    Colon cancer Neg Hx         Social History     Social History Narrative    Not on file       Objective       Vital Signs:   /80 (BP Location: Right arm, Patient Position: Sitting, Cuff Size: Adult)   Pulse 85   Ht 152.4 cm (60\")   Wt 58.1 kg (128 lb)   SpO2 100%   BMI 25.00 kg/m²       Physical Exam  Constitutional:       Appearance: Normal appearance. She is normal weight.   HENT:      Head: Normocephalic and atraumatic.      Nose: Nose normal.   Pulmonary:      Effort: Pulmonary effort is normal.   Skin:     General: Skin is warm and dry.   Neurological:      Mental Status: She is alert and oriented to person, place, and time. Mental status is at baseline.   Psychiatric:         Mood and Affect: Mood normal.         Behavior: Behavior normal.         Thought Content: Thought content normal.         Judgment: Judgment normal.         Result Review :       CBC w/diff          4/29/2023    09:12   CBC w/Diff   WBC 5.66    RBC 4.47    Hemoglobin 13.2    Hematocrit 38.0    MCV 85.0    MCH 29.5    MCHC 34.7    RDW 13.2    Platelets 209    Neutrophil Rel % 23.6    Immature Granulocyte Rel % 0.2    Lymphocyte Rel % 67.0    Monocyte Rel % 8.1    Eosinophil Rel % 0.9    Basophil Rel % 0.2 "      CMP          4/29/2023    09:12   CMP   Glucose 126    BUN 11    Creatinine 0.74    EGFR 80.9    Sodium 135    Potassium 3.4    Chloride 93    Calcium 9.6    Total Protein 7.7    Albumin 4.6    Globulin 3.1    Total Bilirubin 0.4    Alkaline Phosphatase 80    AST (SGOT) 18    ALT (SGPT) 9    Albumin/Globulin Ratio 1.5    BUN/Creatinine Ratio 14.9    Anion Gap 14.8              Assessment and Plan    Diagnoses and all orders for this visit:    1. Irritable bowel syndrome with diarrhea (Primary)    83 year old patient presents to the office for 1 year follow-up with a history of IBS-diarrhea.Patient is managing IBS-D with cholestyramine about 1/2 packet daily and dicyclomine BID.  Patient reports with this regimen she has 2-3 soft formed bowel movements daily.  Dicyclomine is adequately managing fecal urgency and abdominal discomfort.  She avoids certain food triggers.  Overall patient is doing very well from a GI standpoint on current medication regimen.  Patient wishes to continue following up on a yearly basis.  Patient is agreeable to plan will call the office any questions or concerns.    Follow Up   Return in about 1 year (around 1/10/2025).  Patient was given instructions and counseling regarding her condition or for health maintenance advice. Please see specific information pulled into the AVS if appropriate.

## 2025-06-11 ENCOUNTER — OFFICE VISIT (OUTPATIENT)
Dept: GASTROENTEROLOGY | Facility: CLINIC | Age: 85
End: 2025-06-11
Payer: MEDICARE

## 2025-06-11 VITALS
HEIGHT: 60 IN | SYSTOLIC BLOOD PRESSURE: 149 MMHG | WEIGHT: 124.2 LBS | BODY MASS INDEX: 24.39 KG/M2 | DIASTOLIC BLOOD PRESSURE: 71 MMHG | HEART RATE: 94 BPM

## 2025-06-11 DIAGNOSIS — K58.0 IRRITABLE BOWEL SYNDROME WITH DIARRHEA: Primary | ICD-10-CM

## 2025-06-11 PROCEDURE — 3078F DIAST BP <80 MM HG: CPT

## 2025-06-11 PROCEDURE — 1160F RVW MEDS BY RX/DR IN RCRD: CPT

## 2025-06-11 PROCEDURE — 99213 OFFICE O/P EST LOW 20 MIN: CPT

## 2025-06-11 PROCEDURE — 3077F SYST BP >= 140 MM HG: CPT

## 2025-06-11 PROCEDURE — 1159F MED LIST DOCD IN RCRD: CPT

## 2025-06-11 RX ORDER — LOPERAMIDE HYDROCHLORIDE 2 MG/1
2 CAPSULE ORAL 4 TIMES DAILY PRN
Qty: 120 CAPSULE | Refills: 3 | Status: SHIPPED | OUTPATIENT
Start: 2025-06-11

## 2025-06-11 NOTE — PROGRESS NOTES
Chief Complaint  Irritable Bowel Syndrome and Diarrhea    Ibis Tillman is a 84 y.o. female who presents to [facility] for follow-up.    History of present Illness  Patient presents to the office for follow-up with a history of IBS-diarrhea.  Patient previously on cholestyramine half a packet daily and dicyclomine twice daily.  She has discontinue cholestyramine as she felt that it did not necessarily impact symptoms.  About 1-2 times a month to struggle with fecal urgency, incontinence, and diarrhea.  Outside of these episodes she has normal bowel movements.  These episodes can be precipitated when she is feeling anxious or has somewhere to go.  Denies melena and hematochezia.  Denies upper GI symptoms.    Colonoscopy 11/13/2019 by Dr. Dunlap -mild diverticulosis in the sigmoid colon, grade 1 internal hemorrhoids, otherwise normal mucosa throughout.  Random colon biopsies normal.     Past Medical History:   Diagnosis Date    Essential hypertension     High cholesterol     Irritable bowel syndrome        Past Surgical History:   Procedure Laterality Date    COLONOSCOPY  2019    HYSTERECTOMY           Current Outpatient Medications:     amLODIPine (NORVASC) 5 MG tablet, Take 1 tablet by mouth Daily. for blood pressure., Disp: , Rfl:     dicyclomine (BENTYL) 10 MG capsule, Take 1 capsule by mouth 4 (Four) Times a Day Before Meals & at Bedtime., Disp: 120 capsule, Rfl: 2    lisinopril-hydrochlorothiazide (PRINZIDE,ZESTORETIC) 20-25 MG per tablet, Take 1 tablet by mouth Daily. for blood pressure, Disp: , Rfl:     pravastatin (PRAVACHOL) 40 MG tablet, Take 1 tablet by mouth Daily., Disp: , Rfl:     vitamin D (ERGOCALCIFEROL) 1.25 MG (96356 UT) capsule capsule, Take 1 capsule by mouth 1 (One) Time Per Week., Disp: , Rfl:     cholestyramine (QUESTRAN) 4 g packet, TAKE 1 PACKET (4 GRAM) DISSOLVED IN 2 TO 6 OUNCES OF WATER OR NONCARBONATED BEVERAGE BY MOUTH 3 TIMES A DAY,  "Disp: 270 packet, Rfl: 1    loperamide (IMODIUM) 2 MG capsule, Take 1 capsule by mouth 4 (Four) Times a Day As Needed for Diarrhea., Disp: 120 capsule, Rfl: 3    potassium chloride (KLOR-CON) 10 MEQ CR tablet, Daily. (Patient not taking: Reported on 1/10/2024), Disp: , Rfl:     Prevalite 4 GM/DOSE powder, PLEASE SEE ATTACHED FOR DETAILED DIRECTIONS (Patient not taking: Reported on 1/10/2024), Disp: , Rfl:     psyllium (METAMUCIL) 58.6 % packet, Take 1 packet by mouth Daily. (Patient not taking: Reported on 1/10/2024), Disp: , Rfl:     simethicone (MYLICON) 80 MG chewable tablet, Chew 1 tablet Every 6 (Six) Hours As Needed for Flatulence. (Patient not taking: Reported on 1/10/2024), Disp: , Rfl:      Allergies   Allergen Reactions    Erythromycin Unknown - High Severity    Neomycin Unknown - High Severity    Penicillins Other (See Comments)     Causes sores on body    Sulfa Antibiotics Swelling       Family History   Problem Relation Age of Onset    Colon cancer Neg Hx         Social History     Social History Narrative    Not on file       Objective       Vital Signs:   /71 (BP Location: Left arm, Patient Position: Sitting, Cuff Size: Adult)   Pulse 94   Ht 152.4 cm (60\")   Wt 56.3 kg (124 lb 3.2 oz)   BMI 24.26 kg/m²       Physical Exam  Constitutional:       Appearance: Normal appearance. She is normal weight.   HENT:      Head: Normocephalic and atraumatic.      Nose: Nose normal.   Pulmonary:      Effort: Pulmonary effort is normal.   Skin:     General: Skin is warm and dry.   Neurological:      Mental Status: She is alert and oriented to person, place, and time. Mental status is at baseline.   Psychiatric:         Mood and Affect: Mood normal.         Behavior: Behavior normal.         Thought Content: Thought content normal.         Judgment: Judgment normal.         Result Review :                         Assessment and Plan    Diagnoses and all orders for this visit:    1. Irritable bowel syndrome " with diarrhea (Primary)    Other orders  -     loperamide (IMODIUM) 2 MG capsule; Take 1 capsule by mouth 4 (Four) Times a Day As Needed for Diarrhea.  Dispense: 120 capsule; Refill: 3    84-year-old patient presents to the office for follow-up with a history of IBS-diarrhea.  Patient previously on cholestyramine half a packet daily and dicyclomine twice daily.  She has discontinue cholestyramine as she felt that it did not necessarily impact symptoms.  About 1-2 times a month to struggle with fecal urgency, incontinence, and diarrhea.  Outside of these episodes she has normal bowel movements.  These episodes can be precipitated when she is feeling anxious or has somewhere to go.  Discussed how anxiety can perpetuate IBS type symptoms and is likely a contributing cause.  Patient will discuss possible antianxiety medication with PCP.  Patient will utilize Imodium as needed to control symptoms better.  Discussed possible pelvic floor physical therapy as well, patient ultimately wished to defer.  Patient wishes to follow-up in December timeframe due to being out of town until then.  Patient is agreeable plan call office any questions or concerns.    Follow Up   No follow-ups on file.  Patient was given instructions and counseling regarding her condition or for health maintenance advice. Please see specific information pulled into the AVS if appropriate.